# Patient Record
Sex: FEMALE | Race: BLACK OR AFRICAN AMERICAN | NOT HISPANIC OR LATINO
[De-identification: names, ages, dates, MRNs, and addresses within clinical notes are randomized per-mention and may not be internally consistent; named-entity substitution may affect disease eponyms.]

---

## 2021-06-14 PROBLEM — Z00.00 ENCOUNTER FOR PREVENTIVE HEALTH EXAMINATION: Status: ACTIVE | Noted: 2021-06-14

## 2021-07-21 ENCOUNTER — NON-APPOINTMENT (OUTPATIENT)
Age: 31
End: 2021-07-21

## 2021-07-21 ENCOUNTER — APPOINTMENT (OUTPATIENT)
Dept: OBGYN | Facility: CLINIC | Age: 31
End: 2021-07-21
Payer: COMMERCIAL

## 2021-07-21 VITALS
DIASTOLIC BLOOD PRESSURE: 75 MMHG | BODY MASS INDEX: 27.3 KG/M2 | WEIGHT: 195 LBS | HEIGHT: 71 IN | SYSTOLIC BLOOD PRESSURE: 124 MMHG

## 2021-07-21 DIAGNOSIS — Z78.9 OTHER SPECIFIED HEALTH STATUS: ICD-10-CM

## 2021-07-21 DIAGNOSIS — Z83.3 FAMILY HISTORY OF DIABETES MELLITUS: ICD-10-CM

## 2021-07-21 DIAGNOSIS — Z82.49 FAMILY HISTORY OF ISCHEMIC HEART DISEASE AND OTHER DISEASES OF THE CIRCULATORY SYSTEM: ICD-10-CM

## 2021-07-21 DIAGNOSIS — Z01.419 ENCOUNTER FOR GYNECOLOGICAL EXAMINATION (GENERAL) (ROUTINE) W/OUT ABNORMAL FINDINGS: ICD-10-CM

## 2021-07-21 PROCEDURE — 99072 ADDL SUPL MATRL&STAF TM PHE: CPT

## 2021-07-21 PROCEDURE — 99385 PREV VISIT NEW AGE 18-39: CPT

## 2021-07-21 RX ORDER — ETONOGESTREL AND ETHINYL ESTRADIOL .12; .015 MG/D; MG/D
INSERT, EXTENDED RELEASE VAGINAL
Refills: 0 | Status: ACTIVE | COMMUNITY

## 2021-07-21 NOTE — HISTORY OF PRESENT ILLNESS
[Patient reported PAP Smear was normal] : Patient reported PAP Smear was normal [Y] : Patient is sexually active [N] : Patient denies prior pregnancies [FreeTextEntry1] : boyfriend with genital warts\par  [PapSmeardate] : 2019 [LMPDate] : 07/07/2021 [MensesLength] : 5 [PGHxTotal] : 0 [PGHxFullTerm] : 0 [PGHxPremature] : 0 [PGHxAbortions] : 0 [[facility] [PGHxABInduced] : 0 [PGHxABSpont] : 0 [PGHxEctopic] : 0 [PGHxMultBirths] : 0

## 2021-07-26 LAB — HPV HIGH+LOW RISK DNA PNL CVX: NOT DETECTED

## 2021-08-04 LAB — CYTOLOGY CVX/VAG DOC THIN PREP: NORMAL

## 2021-09-16 ENCOUNTER — TRANSCRIPTION ENCOUNTER (OUTPATIENT)
Age: 31
End: 2021-09-16

## 2021-09-16 ENCOUNTER — APPOINTMENT (OUTPATIENT)
Dept: OBGYN | Facility: CLINIC | Age: 31
End: 2021-09-16
Payer: COMMERCIAL

## 2021-09-16 VITALS
HEIGHT: 71 IN | SYSTOLIC BLOOD PRESSURE: 103 MMHG | HEART RATE: 84 BPM | WEIGHT: 188 LBS | BODY MASS INDEX: 26.32 KG/M2 | DIASTOLIC BLOOD PRESSURE: 65 MMHG

## 2021-09-16 DIAGNOSIS — Z80.0 FAMILY HISTORY OF MALIGNANT NEOPLASM OF DIGESTIVE ORGANS: ICD-10-CM

## 2021-09-16 DIAGNOSIS — N93.9 ABNORMAL UTERINE AND VAGINAL BLEEDING, UNSPECIFIED: ICD-10-CM

## 2021-09-16 PROCEDURE — 99213 OFFICE O/P EST LOW 20 MIN: CPT

## 2021-09-16 RX ORDER — FLUTICASONE PROPIONATE 50 UG/1
50 SPRAY, METERED NASAL
Refills: 0 | Status: ACTIVE | COMMUNITY
Start: 2021-09-16

## 2021-09-17 NOTE — HISTORY OF PRESENT ILLNESS
[Y] : Patient uses contraception [N] : Patient denies prior pregnancies [FreeTextEntry1] : spotting on and off since last menses 2  sat. ago\par noticed after sex and after use of vibrator;\par no new meds; no urinary symptoms\par  [Irregular Menstrual Interval] : irregular menstrual interval [PGHxTotal] : 0 [PGHxFullTerm] : 0 [PGHxPremature] : 0 [PGHxAbortions] : 0 [[facility] [PGHxABInduced] : 0 [PGHxABSpont] : 0 [PGHxEctopic] : 0 [PGHxMultBirths] : 0

## 2021-09-17 NOTE — HISTORY OF PRESENT ILLNESS
[Y] : Patient uses contraception [N] : Patient denies prior pregnancies [FreeTextEntry1] : spotting on and off since last menses 2  sat. ago\par noticed after sex and after use of vibrator;\par no new meds; no urinary symptoms\par  [Irregular Menstrual Interval] : irregular menstrual interval [PGHxTotal] : 0 [PGHxFullTerm] : 0 [PGHxPremature] : 0 [PGHxAbortions] : 0 [[facility] [PGHxABInduced] : 0 [PGHxABSpont] : 0 [PGHxEctopic] : 0 [PGHxMultBirths] : 0

## 2021-09-17 NOTE — PHYSICAL EXAM
[Appropriately responsive] : appropriately responsive [Soft] : soft [Non-tender] : non-tender [Labia Majora] : normal [Labia Minora] : normal [Normal] : normal [Uterine Adnexae] : normal

## 2021-09-17 NOTE — PHYSICAL EXAM
[Appropriately responsive] : appropriately responsive [Soft] : soft [Non-tender] : non-tender [Labia Minora] : normal [Labia Majora] : normal [Normal] : normal [Uterine Adnexae] : normal

## 2021-09-20 ENCOUNTER — TRANSCRIPTION ENCOUNTER (OUTPATIENT)
Age: 31
End: 2021-09-20
